# Patient Record
Sex: MALE | Race: WHITE | Employment: FULL TIME | URBAN - METROPOLITAN AREA
[De-identification: names, ages, dates, MRNs, and addresses within clinical notes are randomized per-mention and may not be internally consistent; named-entity substitution may affect disease eponyms.]

---

## 2020-11-23 ENCOUNTER — HOSPITAL ENCOUNTER (EMERGENCY)
Age: 55
Discharge: HOME | End: 2020-11-23
Payer: COMMERCIAL

## 2020-11-23 VITALS
TEMPERATURE: 97.7 F | OXYGEN SATURATION: 98 % | WEIGHT: 260 LBS | SYSTOLIC BLOOD PRESSURE: 179 MMHG | RESPIRATION RATE: 16 BRPM | DIASTOLIC BLOOD PRESSURE: 94 MMHG | BODY MASS INDEX: 34.3 KG/M2 | HEART RATE: 57 BPM

## 2020-11-23 LAB — URIC ACID, SERUM: 6.8 MG/DL (ref 3.4–7)

## 2020-11-23 PROCEDURE — 6360000002 HC RX W HCPCS: Performed by: NURSE PRACTITIONER

## 2020-11-23 PROCEDURE — 36415 COLL VENOUS BLD VENIPUNCTURE: CPT

## 2020-11-23 PROCEDURE — 99212 OFFICE O/P EST SF 10 MIN: CPT

## 2020-11-23 PROCEDURE — 84550 ASSAY OF BLOOD/URIC ACID: CPT

## 2020-11-23 RX ORDER — INDOMETHACIN 25 MG/1
25 CAPSULE ORAL
Qty: 15 CAPSULE | Refills: 0 | Status: SHIPPED | OUTPATIENT
Start: 2020-11-23 | End: 2021-06-30

## 2020-11-23 RX ORDER — DEXAMETHASONE SODIUM PHOSPHATE 10 MG/ML
10 INJECTION, SOLUTION INTRAMUSCULAR; INTRAVENOUS ONCE
Status: COMPLETED | OUTPATIENT
Start: 2020-11-23 | End: 2020-11-23

## 2020-11-23 RX ORDER — DOXYCYCLINE HYCLATE 100 MG/1
100 CAPSULE ORAL 2 TIMES DAILY
COMMUNITY
End: 2021-06-30

## 2020-11-23 RX ADMIN — DEXAMETHASONE SODIUM PHOSPHATE 10 MG: 10 INJECTION, SOLUTION INTRAMUSCULAR; INTRAVENOUS at 16:50

## 2020-11-23 ASSESSMENT — PAIN DESCRIPTION - LOCATION: LOCATION: FOOT

## 2020-11-23 ASSESSMENT — PAIN DESCRIPTION - ORIENTATION: ORIENTATION: LEFT

## 2021-06-29 NOTE — PROGRESS NOTES
[facility] Cardiology consult  Dr. Armenta Found      Reason for Consult: Chest Pain  Referring Physician: Rudy Kong DO     CHIEF COMPLAINT:   Chief Complaint   Patient presents with    Chest Pain     Consult per Dr nAn Sanchez for CP. PAtient states he has had 2 episodes of chest pain when he was on Lisinopril and then was switched to Lorsartan. Patient 's dad had a MI at 48. Had bypass. HISTORY OF PRESENT ILLNESS:   64year old male with no significant past medical history is here for evaluation of chest pain. Chest pain, comes and goes, attributed that to lisinopril?,  At rest and with exertion, otherwise patient denies any shortness of breath, no lightheadedness, no dizziness, no palpitations, no pedal edema, no PND, no orthopnea, no syncope, no presyncopal episodes. Significant family history of early CAD, father had his first heart attack in his early 46s    Past Medical History:   Diagnosis Date    Hypertension     Pneumonia          Past Surgical History:   Procedure Laterality Date    HERNIA REPAIR      TONSILLECTOMY AND ADENOIDECTOMY      VEIN SURGERY           Current Outpatient Medications   Medication Sig Dispense Refill    losartan (COZAAR) 50 MG tablet take 1 tablet by mouth once daily      sulfamethoxazole-trimethoprim (BACTRIM DS;SEPTRA DS) 800-160 MG per tablet take 1 tablet by mouth twice a day for 1 MONTH then take 1 tablet once daily for 1 MONTH       No current facility-administered medications for this visit.          Allergies as of 06/30/2021    (No Known Allergies)       Social History     Socioeconomic History    Marital status:      Spouse name: Not on file    Number of children: Not on file    Years of education: Not on file    Highest education level: Not on file   Occupational History    Not on file   Tobacco Use    Smoking status: Never Smoker    Smokeless tobacco: Never Used    Tobacco comment: 2nd hand smoke   Vaping Use    Vaping Use: Never used Substance and Sexual Activity    Alcohol use: No     Comment: 1 cup of coffee a day     Drug use: No    Sexual activity: Never   Other Topics Concern    Not on file   Social History Narrative    Not on file     Social Determinants of Health     Financial Resource Strain:     Difficulty of Paying Living Expenses:    Food Insecurity:     Worried About Running Out of Food in the Last Year:     [address] in the Last Year:    Transportation Needs:     Lack of Transportation (Medical):      Lack of Transportation (Non-Medical):    Physical Activity:     Days of Exercise per Week:     Minutes of Exercise per Session:    Stress:     Feeling of Stress :    Social Connections:     Frequency of Communication with Friends and Family:     Frequency of Social Gatherings with Friends and Family:     Attends Buddhism Services:     Active Member of Clubs or Organizations:     Attends Club or Organization Meetings:     Marital Status:    Intimate Partner Violence:     Fear of Current or Ex-Partner:     Emotionally Abused:     Physically Abused:     Sexually Abused:        Family History   Problem Relation Age of Onset    Arrhythmia Mother     Heart Attack Father     Heart Surgery Father        REVIEW OF SYSTEMS:     CONSTITUTIONAL:  negative for  fevers, chills, sweats and fatigue  EYES:  negative for  double vision, blurred vision and blind spots  HEENT:  negative for  tinnitus, earaches, nasal congestion and epistaxis  RESPIRATORY:  negative for  dry cough, cough with sputum, dyspnea, wheezing and hemoptysis  CARDIOVASCULAR: as per HPI  GASTROINTESTINAL:  negative for nausea, vomiting, diarrhea, constipation, pruritus and jaundice  GENITOURINARY:  negative for frequency, dysuria, nocturia, urinary incontinence and hesitancy  HEMATOLOGIC/LYMPHATIC:  negative for easy bruising, bleeding, lymphadenopathy and petechiae  ALLERGIC/IMMUNOLOGIC:  negative for urticaria, hay fever and angioedema  ENDOCRINE: negative for heat intolerance, cold intolerance, tremor, hair loss and diabetic symptoms including neither polyuria nor polydipsia nor blurred vision  MUSCULOSKELETAL:  negative for  myalgias, arthralgias, joint swelling, stiff joints and decreased range of motion  NEUROLOGICAL:  negative for memory problems, speech problems, visual disturbance, dysphagia, weakness and numbness      PHYSICAL EXAM:   CONSTITUTIONAL:  awake, alert, cooperative, no apparent distress, and appears stated age  EYES:  lids and lashes normal and pupils equal, round and reactive to light, anicteric sclerae  HEAD:  normocepalic, without obvious abnormality, atraumatic, pink, moist mucous membranes. NECK:  Supple, symmetrical, trachea midline, no adenopathy, thyroid symmetric, not enlarged and no tenderness, skin normal  HEMATOLOGIC/LYMPHATICS:  no cervical lymphadenopathy and no supraclavicular lymphadenopathy  LUNGS:  No increased work of breathing, good air exchange, clear to auscultation bilaterally, no crackles or wheezing  CARDIOVASCULAR:  Normal apical impulse, regular rate and rhythm, normal S1 and S2, no S3 or S4, and no murmur noted and no JVD, no carotid bruit, no pedal edema, good carotid upstroke bilaterally. ABDOMEN:  Soft, nontender, no masses, no hepatomegaly or splenomegaly, BS+  CHEST: nontender to palpation, expands symmetrically  MUSCULOSKELETAL:  No clubbing no cyanosis. there is no redness, warmth, or swelling of the joints  full range of motion noted  NEUROLOGIC:  Alert, awake,oriented x3, no focal neurologic deficit was appreciated  SKIN:  no bruising or bleeding, normal skin color, texture, turgor and no redness, warmth, or swelling        /64 (Site: Right Upper Arm, Position: Sitting, Cuff Size: Medium Adult)   Pulse 68   Ht 6' 1\" (1.854 m)   Wt 280 lb (127 kg)   BMI 36.94 kg/m²     DATA:   I personally reviewed the visit EKG with the following interpretation: Sinus rhythm    EKG 2/3/21 Sinus Rhythm    ECHO: 2/19/21 Right ventricular enlargement, otherwise normal appearing study. Stress Test:  Angiograph    Cardiology Labs: BMP:  No results found for: NA, K, CL, CO2, BUN, CREATININE  CMP:  No results found for: NA, K, CL, CO2, BUN, CREATININE, PROT, ALB  CBC:  No results found for: WBC, RBC, HGB, HCT, MCV, RDW, PLT  PT/INR:  No results found for: PTINR  PT/INR Warfarin:  No components found for: PTPATWAR, PTINRWAR  PTT:  No results found for: APTT  PTT Heparin:  No components found for: APTTHEP  Magnesium:  No results found for: MG  TSH:  No results found for: TSH  TROPONIN:  No components found for: TROP  BNP:  No results found for: BNP  FASTING LIPID PANEL:  No results found for: CHOL, HDL, TRIG  No orders to display     I have personally reviewed the laboratory, cardiac diagnostic and radiographic testing as outlined above:      IMPRESSION:  1. Chest pain: Atypical, significant family history of early CAD, positive hypertension, no previous functional ischemic evaluation, will schedule for regular exercise stress test  2. Abnormal echo cardiogram showing mild right ventricular enlargement: We will repeat echocardiogram in a year or so for further evaluation  3. Hypertension: Controlled  4. Family history of early CAD    RECOMMENDATIONS:   1. Regular exercise stress test  2. Continue current treatment  3. Patient was strongly advised to call 911 if symptoms recur or get worse for any reason  4. Follow-up with Dr. Marlyn Morrison as scheduled  5. Follow-up with Dr. Flako Parada after his test  I have reviewed my findings and recommendations with patient    Thank you for the consult  Electronically signed by Torrey Pierce MD on 7/6/2021 at 9:30 AM      NOTE: This report was transcribed using voice recognition software.  Every effort was made to ensure accuracy; however, inadvertent computerized transcription errors may be present

## 2021-06-30 ENCOUNTER — OFFICE VISIT (OUTPATIENT)
Dept: CARDIOLOGY CLINIC | Age: 56
End: 2021-06-30
Payer: COMMERCIAL

## 2021-06-30 VITALS
WEIGHT: 280 LBS | BODY MASS INDEX: 37.11 KG/M2 | HEART RATE: 68 BPM | DIASTOLIC BLOOD PRESSURE: 64 MMHG | SYSTOLIC BLOOD PRESSURE: 110 MMHG | HEIGHT: 73 IN

## 2021-06-30 DIAGNOSIS — R07.9 CHEST PAIN, UNSPECIFIED TYPE: Primary | ICD-10-CM

## 2021-06-30 PROCEDURE — 99244 OFF/OP CNSLTJ NEW/EST MOD 40: CPT | Performed by: INTERNAL MEDICINE

## 2021-06-30 PROCEDURE — 93000 ELECTROCARDIOGRAM COMPLETE: CPT | Performed by: INTERNAL MEDICINE

## 2021-06-30 RX ORDER — LOSARTAN POTASSIUM 50 MG/1
TABLET ORAL
COMMUNITY
Start: 2021-06-10

## 2021-06-30 RX ORDER — SULFAMETHOXAZOLE AND TRIMETHOPRIM 800; 160 MG/1; MG/1
TABLET ORAL
COMMUNITY
Start: 2021-06-16

## 2021-07-09 ENCOUNTER — TELEPHONE (OUTPATIENT)
Dept: CARDIOLOGY | Age: 56
End: 2021-07-09

## 2021-07-16 ENCOUNTER — TELEPHONE (OUTPATIENT)
Dept: CARDIOLOGY | Age: 56
End: 2021-07-16

## 2021-07-16 NOTE — TELEPHONE ENCOUNTER
Left a message on patients home phone reminder of appointment on 7/20/21 at 7:45 for a Regular stress test instructions left  patient asked to call with any questions

## 2021-07-20 ENCOUNTER — HOSPITAL ENCOUNTER (OUTPATIENT)
Dept: CARDIOLOGY | Age: 56
Discharge: HOME | End: 2021-07-20
Payer: COMMERCIAL

## 2021-07-20 VITALS
DIASTOLIC BLOOD PRESSURE: 76 MMHG | WEIGHT: 280 LBS | RESPIRATION RATE: 20 BRPM | BODY MASS INDEX: 37.11 KG/M2 | HEIGHT: 73 IN | HEART RATE: 60 BPM | OXYGEN SATURATION: 98 % | SYSTOLIC BLOOD PRESSURE: 130 MMHG

## 2021-07-20 DIAGNOSIS — R07.9 CHEST PAIN, UNSPECIFIED TYPE: ICD-10-CM

## 2021-07-20 PROCEDURE — 99999 PR OFFICE/OUTPT VISIT,PROCEDURE ONLY: CPT | Performed by: INTERNAL MEDICINE

## 2021-07-20 PROCEDURE — 93017 CV STRESS TEST TRACING ONLY: CPT

## 2021-07-20 NOTE — PROCEDURES
[address] [facility]. [address], [facility]  [phone]    Exercise Stress Study       Name: [facility] [address]   [address] Account Number:  [de-identified]      :  1965          Sex: male         Date of Study:  2021    Height: 6' 1\" (185.4 cm)          Weight: 280 lb (127 kg)    Ordering Provider: Yandy Calloway          PCP: Edwina Wilson DO    Cardiologist: Yandy Calloway              Interpreting Physician: Gabby Goode. MD Karri    Indication:   Detecting the presence and location of coronary artery disease    Clinical History:   Patient has no known history of coronary artery disease. Resting ECG:    WI int 182m sec, QRS int 96m sec, QT int 414m sec; HR 60 bpm  SR and is normal    Exercise: The patient exercised using a Levar protocol, completing 7:30 minutes and reaching an estimated work load of 46.5 metabolic equivalents (METS). Resting HR was 60. Peak exercise heart rate was 155 ( 94% of maximum predicted heart rate for age). Baseline /76. Peak exercise /54. The blood pressure response to exercise was normal      Exercise was terminated due to heart rate attained. Progressive dyspnea. The patient experienced no chest pain with exercise. Pulse oximetry was used to monitor oxygen saturation during the stress test.  The study was performed on Room Air. The resting pulse oximeter was 98%. The post stress O2 saturation seen during exercise was 98 %. Exercise ECG:   The patient demonstrated occasional PVC's during exercise. With exercise, there were no ST segment changes of significance at the heart rate achieved. Impression:    1. Exercise EKG was normal.  2. The patient experienced no chest pain with exercise. 3. [place] treadmill score was +7.5 implying low risk of acute ischemic events. 4. Exercise capacity was average.      Thank you for sending your patient to this [facility] Accredited Facility.     Electronically signed by Marisela Tolentino MD on 7/20/2021 at 9:28 AM

## 2021-07-21 ENCOUNTER — TELEPHONE (OUTPATIENT)
Dept: CARDIOLOGY CLINIC | Age: 56
End: 2021-07-21

## 2023-06-10 ENCOUNTER — HOSPITAL ENCOUNTER (EMERGENCY)
Age: 58
Discharge: HOME | End: 2023-06-10
Payer: COMMERCIAL

## 2023-06-10 VITALS
SYSTOLIC BLOOD PRESSURE: 155 MMHG | BODY MASS INDEX: 35.62 KG/M2 | DIASTOLIC BLOOD PRESSURE: 85 MMHG | OXYGEN SATURATION: 97 % | HEART RATE: 79 BPM | RESPIRATION RATE: 18 BRPM | TEMPERATURE: 99.5 F | WEIGHT: 270 LBS

## 2023-06-10 DIAGNOSIS — L23.7 POISON IVY: Primary | ICD-10-CM

## 2023-06-10 PROCEDURE — 99211 OFF/OP EST MAY X REQ PHY/QHP: CPT

## 2023-06-10 RX ORDER — PREDNISONE 10 MG/1
TABLET ORAL
Qty: 18 TABLET | Refills: 0 | Status: SHIPPED | OUTPATIENT
Start: 2023-06-10

## 2023-06-10 ASSESSMENT — PAIN - FUNCTIONAL ASSESSMENT: PAIN_FUNCTIONAL_ASSESSMENT: NONE - DENIES PAIN

## 2023-06-10 NOTE — ED PROVIDER NOTES
[facility]  EMERGENCY DEPARTMENT ENCOUNTER        NAME: Danika Leonard  :  1965  MRN:  [id number]  Date of evaluation: 6/10/2023  Provider: Brenna Marks PA-C  PCP: Ricky Guzman DO  Note Started : 5:32 PM EDT 6/10/23    Chief Complaint: Rash (Poison ivy on arms and legs, started last weekend)      This is a 59-year-old male who presents to urgent care complaining of a poison ivy rash mostly on his extremities for the past week. He is washes close and he denies any difficulty breathing or swallowing. No fevers or chills. He is been using topical medication such as calamine lotion. On first contact patient he appears to be in no acute distress. Review of Systems  Pertinent positives and negatives are stated within HPI, all other systems reviewed and are negative. Allergies: Patient has no known allergies. --------------------------------------------- PAST HISTORY ---------------------------------------------  Past Medical History:  has a past medical history of Hypertension and Pneumonia. Past Surgical History:  has a past surgical history that includes Tonsillectomy and adenoidectomy; hernia repair; and Vein Surgery. Social History:  reports that he has never smoked. He has never used smokeless tobacco. He reports that he does not drink alcohol and does not use drugs. Family History: family history includes Arrhythmia in his mother; Heart Attack in his father; Heart Surgery in his father. The patients home medications have been reviewed. The nursing notes within the ED encounter have been reviewed.      ------------------------------------------------SCREENINGS----------------------------------------------                        CIWA Assessment  BP: (!) 155/85  Pulse: 79           ---------------------------------------------PHYSICAL EXAM --------------------------------------------    Vitals:    06/10/23 1713 06/10/23 1716   BP:  (!) 155/

## 2025-07-15 ENCOUNTER — HOSPITAL ENCOUNTER (EMERGENCY)
Age: 60
Discharge: HOME | End: 2025-07-15
Payer: COMMERCIAL

## 2025-07-15 VITALS
SYSTOLIC BLOOD PRESSURE: 152 MMHG | OXYGEN SATURATION: 96 % | RESPIRATION RATE: 18 BRPM | TEMPERATURE: 98.6 F | HEART RATE: 76 BPM | WEIGHT: 290 LBS | DIASTOLIC BLOOD PRESSURE: 82 MMHG | BODY MASS INDEX: 38.26 KG/M2

## 2025-07-15 DIAGNOSIS — J02.9 ACUTE PHARYNGITIS, UNSPECIFIED ETIOLOGY: Primary | ICD-10-CM

## 2025-07-15 LAB
SPECIMEN SOURCE: NORMAL
STREP A, MOLECULAR: NEGATIVE

## 2025-07-15 PROCEDURE — 99211 OFF/OP EST MAY X REQ PHY/QHP: CPT

## 2025-07-15 PROCEDURE — 87651 STREP A DNA AMP PROBE: CPT

## 2025-07-15 RX ORDER — PREDNISONE 20 MG/1
20 TABLET ORAL DAILY
Qty: 5 TABLET | Refills: 0 | Status: SHIPPED | OUTPATIENT
Start: 2025-07-15 | End: 2025-07-20

## 2025-07-15 NOTE — ED PROVIDER NOTES
[facility] URGENT CARE     NAME: Ruben Fragoso  :  1965  MRN:  [id number]  Date of evaluation: 7/15/2025  Provider: Dominic Blood PA-C  PCP: Miguel Duffy DO  Note Started : 4:33 PM EDT 7/15/25    Chief Complaint: Pharyngitis (Hurts to swallow for a few days)      This is a 60-year-old male who presents to urgent care stating for the past several days he has been having a sore throat.  He denies any fever or chills no lightheadedness or dizziness.  No cough or chest pain or shortness of breath.  No earache.  No sinus pain or pressure.  On first contact patient he appears to be in no acute distress.        Review of Systems  Pertinent positives and negatives are stated within HPI, all other systems reviewed and are negative.     Allergies: Patient has no known allergies.     --------------------------------------------- PAST HISTORY ---------------------------------------------  Past Medical History:  has a past medical history of Hypertension and Pneumonia.    Past Surgical History:  has a past surgical history that includes Tonsillectomy and adenoidectomy; hernia repair; and Vein Surgery.    Social History:  reports that he has never smoked. He has never used smokeless tobacco. He reports that he does not drink alcohol and does not use drugs.    Family History: family history includes Arrhythmia in his mother; Heart Attack in his father; Heart Surgery in his father.     The patient’s home medications have been reviewed.    The nursing notes within the ED encounter have been reviewed.     ------------------------------------------------SCREENINGS----------------------------------------------                        CIWA Assessment  BP: (!) 152/82  Pulse: 76           ---------------------------------------------PHYSICAL EXAM --------------------------------------------    Vitals:    07/15/25 1612 07/15/25 1615   BP:  (!) 152/82   Pulse:  76   Resp:  18   Temp:  98.6 °F (37 °C)   TempSrc: